# Patient Record
Sex: FEMALE | Race: WHITE
[De-identification: names, ages, dates, MRNs, and addresses within clinical notes are randomized per-mention and may not be internally consistent; named-entity substitution may affect disease eponyms.]

---

## 2021-08-06 ENCOUNTER — HOSPITAL ENCOUNTER (EMERGENCY)
Dept: HOSPITAL 26 - MED | Age: 26
Discharge: HOME | End: 2021-08-06
Payer: COMMERCIAL

## 2021-08-06 VITALS — DIASTOLIC BLOOD PRESSURE: 71 MMHG | SYSTOLIC BLOOD PRESSURE: 116 MMHG

## 2021-08-06 VITALS — HEIGHT: 66 IN | BODY MASS INDEX: 21.86 KG/M2 | WEIGHT: 136 LBS

## 2021-08-06 DIAGNOSIS — R19.7: ICD-10-CM

## 2021-08-06 DIAGNOSIS — R10.31: Primary | ICD-10-CM

## 2021-08-06 LAB
ALBUMIN FLD-MCNC: 4.1 G/DL (ref 3.4–5)
ANION GAP SERPL CALCULATED.3IONS-SCNC: 18.6 MMOL/L (ref 8–16)
APPEARANCE UR: CLEAR
AST SERPL-CCNC: 16 U/L (ref 15–37)
BASOPHILS # BLD AUTO: 0.1 K/UL (ref 0–0.22)
BASOPHILS NFR BLD AUTO: 0.8 % (ref 0–2)
BILIRUB SERPL-MCNC: 0.6 MG/DL (ref 0–1)
BILIRUB UR QL STRIP: NEGATIVE
BUN SERPL-MCNC: 10 MG/DL (ref 7–18)
CHLORIDE SERPL-SCNC: 102 MMOL/L (ref 98–107)
CO2 SERPL-SCNC: 22.1 MMOL/L (ref 21–32)
COLOR UR: YELLOW
CREAT SERPL-MCNC: 0.8 MG/DL (ref 0.6–1.3)
EOSINOPHIL # BLD AUTO: 0.1 K/UL (ref 0–0.4)
EOSINOPHIL NFR BLD AUTO: 1.4 % (ref 0–4)
ERYTHROCYTE [DISTWIDTH] IN BLOOD BY AUTOMATED COUNT: 14.8 % (ref 11.6–13.7)
GFR SERPL CREATININE-BSD FRML MDRD: 113 ML/MIN (ref 90–?)
GLUCOSE SERPL-MCNC: 90 MG/DL (ref 74–106)
GLUCOSE UR STRIP-MCNC: NEGATIVE MG/DL
HCT VFR BLD AUTO: 34.2 % (ref 36–48)
HGB BLD-MCNC: 11.3 G/DL (ref 12–16)
HGB UR QL STRIP: NEGATIVE
LEUKOCYTE ESTERASE UR QL STRIP: NEGATIVE
LIPASE SERPL-CCNC: 122 U/L (ref 73–393)
LYMPHOCYTES # BLD AUTO: 2.7 K/UL (ref 2.5–16.5)
LYMPHOCYTES NFR BLD AUTO: 40 % (ref 20.5–51.1)
MCH RBC QN AUTO: 26 PG (ref 27–31)
MCHC RBC AUTO-ENTMCNC: 33 G/DL (ref 33–37)
MCV RBC AUTO: 79.7 FL (ref 80–94)
MONOCYTES # BLD AUTO: 0.6 K/UL (ref 0.8–1)
MONOCYTES NFR BLD AUTO: 9.5 % (ref 1.7–9.3)
NEUTROPHILS # BLD AUTO: 3.3 K/UL (ref 1.8–7.7)
NEUTROPHILS NFR BLD AUTO: 48.3 % (ref 42.2–75.2)
NITRITE UR QL STRIP: NEGATIVE
PH UR STRIP: 7 [PH] (ref 5–9)
PLATELET # BLD AUTO: 248 K/UL (ref 140–450)
POTASSIUM SERPL-SCNC: 3.7 MMOL/L (ref 3.5–5.1)
RBC # BLD AUTO: 4.3 MIL/UL (ref 4.2–5.4)
SODIUM SERPL-SCNC: 139 MMOL/L (ref 136–145)
WBC # BLD AUTO: 6.8 K/UL (ref 4.8–10.8)

## 2021-08-06 PROCEDURE — 99285 EMERGENCY DEPT VISIT HI MDM: CPT

## 2021-08-06 PROCEDURE — 83690 ASSAY OF LIPASE: CPT

## 2021-08-06 PROCEDURE — 36415 COLL VENOUS BLD VENIPUNCTURE: CPT

## 2021-08-06 PROCEDURE — 81025 URINE PREGNANCY TEST: CPT

## 2021-08-06 PROCEDURE — 96360 HYDRATION IV INFUSION INIT: CPT

## 2021-08-06 PROCEDURE — 81003 URINALYSIS AUTO W/O SCOPE: CPT

## 2021-08-06 PROCEDURE — 85025 COMPLETE CBC W/AUTO DIFF WBC: CPT

## 2021-08-06 PROCEDURE — 74177 CT ABD & PELVIS W/CONTRAST: CPT

## 2021-08-06 PROCEDURE — 80053 COMPREHEN METABOLIC PANEL: CPT

## 2021-08-06 NOTE — NUR
Pt to lobby for next available bed. Dr. Tan made aware of patient symptoms. 
Abd triage order set placed.

## 2021-08-06 NOTE — NUR
RECEIVED PT AWAKE AND ALERT WITH C/O RLQ PAIN. DENIES N/V/D. RESPIRATIONS ARE 
REGULAR AND UNLABORED. REFUSED IV, BUT AGREES AT THIS TIME. 20G ESTABLISHED 
LEFT A/C WITH GOOD BLOOD RETURN, FLUSHES WITH EASE. PT C/O PAIN AND INSISTS 
THAT IT BE REMOVED. IV D/C'D.

## 2023-04-27 ENCOUNTER — HOSPITAL ENCOUNTER (EMERGENCY)
Dept: HOSPITAL 26 - MED | Age: 28
Discharge: HOME | End: 2023-04-27
Payer: COMMERCIAL

## 2023-04-27 VITALS — SYSTOLIC BLOOD PRESSURE: 132 MMHG | DIASTOLIC BLOOD PRESSURE: 71 MMHG

## 2023-04-27 VITALS — DIASTOLIC BLOOD PRESSURE: 68 MMHG | SYSTOLIC BLOOD PRESSURE: 128 MMHG

## 2023-04-27 VITALS — WEIGHT: 142 LBS | BODY MASS INDEX: 22.82 KG/M2 | HEIGHT: 66 IN
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
## 2023-04-27 DIAGNOSIS — R03.0: ICD-10-CM

## 2023-04-27 DIAGNOSIS — Z79.1: ICD-10-CM
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
## 2023-04-27 DIAGNOSIS — F12.90: ICD-10-CM

## 2023-04-27 DIAGNOSIS — S80.02XA: Primary | ICD-10-CM
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
## 2023-04-27 PROCEDURE — 81025 URINE PREGNANCY TEST: CPT

## 2023-04-27 PROCEDURE — 73562 X-RAY EXAM OF KNEE 3: CPT

## 2023-04-27 PROCEDURE — 99283 EMERGENCY DEPT VISIT LOW MDM: CPT

## 2023-04-27 NOTE — NUR
PT C/O LT KNEE SWELLING, PAIN, AND BRUISING AFTER FALLING OF THE SKATEBOART AND 
LANDING STRAIGHT ON IT. SAFETY MAINTAINED.